# Patient Record
Sex: FEMALE | URBAN - METROPOLITAN AREA
[De-identification: names, ages, dates, MRNs, and addresses within clinical notes are randomized per-mention and may not be internally consistent; named-entity substitution may affect disease eponyms.]

---

## 2021-08-12 ENCOUNTER — OFFICE VISIT (OUTPATIENT)
Dept: URBAN - METROPOLITAN AREA CLINIC 19 | Facility: CLINIC | Age: 46
End: 2021-08-12

## 2021-08-12 ENCOUNTER — WEB ENCOUNTER (OUTPATIENT)
Dept: URBAN - METROPOLITAN AREA CLINIC 35 | Facility: CLINIC | Age: 46
End: 2021-08-12

## 2021-08-13 ENCOUNTER — OFFICE VISIT (OUTPATIENT)
Dept: URBAN - METROPOLITAN AREA CLINIC 33 | Facility: CLINIC | Age: 46
End: 2021-08-13

## 2021-08-13 ENCOUNTER — TELEPHONE ENCOUNTER (OUTPATIENT)
Dept: URBAN - METROPOLITAN AREA CLINIC 35 | Facility: CLINIC | Age: 46
End: 2021-08-13

## 2021-08-13 VITALS
OXYGEN SATURATION: 98 % | BODY MASS INDEX: 21.38 KG/M2 | DIASTOLIC BLOOD PRESSURE: 62 MMHG | HEIGHT: 66 IN | WEIGHT: 133 LBS | SYSTOLIC BLOOD PRESSURE: 105 MMHG | HEART RATE: 57 BPM

## 2021-08-13 RX ORDER — ESCITALOPRAM OXALATE 5 MG/1
1 TABLET TABLET, FILM COATED ORAL ONCE A DAY
Qty: 30 | Status: ACTIVE | COMMUNITY

## 2021-08-13 RX ORDER — DICYCLOMINE HYDROCHLORIDE 10 MG/1
1 CAPSULE CAPSULE ORAL THREE TIMES A DAY
Qty: 90 CAPSULE | Refills: 4 | OUTPATIENT
Start: 2021-08-13

## 2021-08-13 RX ORDER — SODIUM, POTASSIUM,MAG SULFATES 17.5-3.13G
ML AS DIRECTED SOLUTION, RECONSTITUTED, ORAL ORAL
Qty: 1 KIT | Refills: 0 | OUTPATIENT
Start: 2021-08-13

## 2021-08-13 NOTE — HPI-MIGRATED HPI
;     Colitis : 45 y/o female patient presents today with recent diagnosis of colitis.   Patient state that her symptoms started to occur on (07/16/2021) the day after she got back to the  from the [facility]. She had frequent diarrhea up to 15 times a day. In the next few days, her symptoms worsened with severe abdominal pain and had to emergently go to the hospital.  While she was in [facility]  (07/26/2021), she had a CT of abd showing:  Mild thickening of the wall of the colon, either from old colitis versus underdistention. No significant pericolonic inflammation. No signs of perforation, abscess or bowel obstruction at the present time. Low-attenuation prominent region of the uterine cervix.  Patient was discharged with antibiotic regimen: Cipro 500 mg, Flagyl 500 mg, and Acetaminophen with codeine 300/30 for 14 days.  Patient denies associated symptoms such as rectal bleeding or bloody diarrhea. Patient continues to experience abdominal cramps and pain, bloating, gas, decreased in appetite, weight loss, early satiety, and change in bowel habits.   Patient admits that she has 1 bowel movement this week. In the last 3 weeks, she has had different range of bowel movements from watery, loose, and semi formed stools. Patient denies mucus or melena in stool but admits occasional bleeding due to possible hemorrhoids.   Patient states that she has been Imodium as needed for relief of symptoms.  Patient notes that she has lost 10 lbs since her first occurrence.   Patient denies having a colonoscopy or EGD in the past. Patient admits that her mother had diverticulitis and had partial colectomy in the past.    ;

## 2021-08-17 ENCOUNTER — OFFICE VISIT (OUTPATIENT)
Dept: URBAN - METROPOLITAN AREA SURGERY CENTER 8 | Facility: SURGERY CENTER | Age: 46
End: 2021-08-17

## 2021-09-01 ENCOUNTER — OFFICE VISIT (OUTPATIENT)
Dept: URBAN - METROPOLITAN AREA CLINIC 31 | Facility: CLINIC | Age: 46
End: 2021-09-01

## 2021-09-01 VITALS
WEIGHT: 132 LBS | HEART RATE: 65 BPM | BODY MASS INDEX: 21.21 KG/M2 | SYSTOLIC BLOOD PRESSURE: 105 MMHG | DIASTOLIC BLOOD PRESSURE: 62 MMHG | HEIGHT: 66 IN | OXYGEN SATURATION: 99 %

## 2021-09-01 PROBLEM — 1187071008 LYMPHOCYTIC COLITIS: Status: ACTIVE | Noted: 2021-09-01

## 2021-09-01 RX ORDER — ESCITALOPRAM OXALATE 5 MG/1
1 TABLET TABLET, FILM COATED ORAL ONCE A DAY
Qty: 30 | Status: ACTIVE | COMMUNITY

## 2021-09-01 RX ORDER — MESALAMINE 1.2 G/1
2 TABLETS TABLET, DELAYED RELEASE ORAL ONCE A DAY
Qty: 60 | Refills: 6 | OUTPATIENT
Start: 2021-09-01

## 2021-09-01 RX ORDER — DICYCLOMINE HYDROCHLORIDE 10 MG/1
1 CAPSULE CAPSULE ORAL THREE TIMES A DAY
Qty: 90 CAPSULE | Refills: 4 | Status: ACTIVE | COMMUNITY
Start: 2021-08-13

## 2021-09-01 RX ORDER — SODIUM, POTASSIUM,MAG SULFATES 17.5-3.13G
ML AS DIRECTED SOLUTION, RECONSTITUTED, ORAL ORAL
Qty: 1 KIT | Refills: 0 | Status: ON HOLD | COMMUNITY
Start: 2021-08-13

## 2021-09-01 NOTE — HPI-MIGRATED HPI
;   ;     Colonoscopy Follow-Up : Patient presents today for follow up to her colonoscopy which was completed on   (08/17/2021) by Dr. Tone Ramirez.  Patient denies any complications after her procedure. Patient currently admits 3-4 loose and thin bowel movements per day.  Patient denies any melena or blood in stools. Patient admits occasional mucus in stools. Patient denies any associated abdominal pain.   Patient admits occasional heartburn, bloating, and gas.    Colonoscopy report shows: 1. Erythematous mucosa in the ascending colon, in the transverse colon, in the descending colon and in the sigmoid colon. Biopsied.  2. The examined portion of the ileum was normal. Biopsied.  3. Diverticulosis in the mid ascending colon.;   Colitis : Patient currently admits 3-4 loose and thin bowel movements per day.  Patient denies any melena or blood in stools. Patient admits occasional mucus in stools.   Patient denies associated symptoms such as rectal bleeding or bloody diarrhea. Patient currently denies abdominal cramps and pain, but admits bloating and gas.  Patient admits that she recently had epigastric discomfort and heartburn, which resolved on its own.   Patient states she continues to take Dicyclomine 10 mg  as needed for relief of symptoms.       Last visit  (08/13/2021) 45 y/o female patient presents today with recent diagnosis of colitis.   Patient state that her symptoms started to occur on (07/16/2021) the day after she got back to the  from the [facility]. She had frequent diarrhea up to 15 times a day. In the next few days, her symptoms worsened with severe abdominal pain and had to emergently go to the hospital.  While she was in [facility]  (07/26/2021), she had a CT of abd showing:  Mild thickening of the wall of the colon, either from old colitis versus underdistention. No significant pericolonic inflammation. No signs of perforation, abscess or bowel obstruction at the present time. Low-attenuation prominent region of the uterine cervix.  Patient was discharged with antibiotic regimen: Cipro 500 mg, Flagyl 500 mg, and Acetaminophen with codeine 300/30 for 14 days.  Patient denies associated symptoms such as rectal bleeding or bloody diarrhea. Patient continues to experience abdominal cramps and pain, bloating, gas, decreased in appetite, weight loss, early satiety, and change in bowel habits.   Patient admits that she has 1 bowel movement this week. In the last 3 weeks, she has had different range of bowel movements from watery, loose, and semi formed stools. Patient denies mucus or melena in stool but admits occasional bleeding due to possible hemorrhoids.   Patient states that she has been Imodium as needed for relief of symptoms.  Patient notes that she has lost 10 lbs since her first occurrence.   Patient denies having a colonoscopy or EGD in the past. Patient admits that her mother had diverticulitis and had partial colectomy in the past.;

## 2021-09-02 ENCOUNTER — TELEPHONE ENCOUNTER (OUTPATIENT)
Dept: URBAN - METROPOLITAN AREA CLINIC 35 | Facility: CLINIC | Age: 46
End: 2021-09-02

## 2021-09-02 RX ORDER — SULFASALAZINE 500 MG/1
2 TABLETS TABLET ORAL BID
Qty: 120 TABLET | Refills: 6 | OUTPATIENT
Start: 2021-09-08